# Patient Record
Sex: MALE | Race: WHITE | URBAN - METROPOLITAN AREA
[De-identification: names, ages, dates, MRNs, and addresses within clinical notes are randomized per-mention and may not be internally consistent; named-entity substitution may affect disease eponyms.]

---

## 2018-05-30 ENCOUNTER — HISTORICAL (OUTPATIENT)
Dept: ADMINISTRATIVE | Facility: HOSPITAL | Age: 68
End: 2018-05-30

## 2018-06-29 ENCOUNTER — HISTORICAL (OUTPATIENT)
Dept: ADMINISTRATIVE | Facility: HOSPITAL | Age: 68
End: 2018-06-29

## 2022-04-30 NOTE — OP NOTE
Patient:   Hung Hartley             MRN: [id number]            FIN: [id number]               Age:   67 years     Sex:  Male     :  1950   Associated Diagnoses:   None   Author:   Tee Hanson MD      Preoperative Diagnosis: Cataract Left eye    Postoperative Diagnosis: Cataract Left eye    Procedure: Phacoemulsification with intaocular lens implantations Left eye    Surgeon: Tee Hanson MD    Assistant:  Liliana Mcfarlane, [facility]    Anestheisa: Topical    Complications: None    The patient was brought into the operating suite, where the patient was correctly identified as was the operative eye via timeout.  The patient was prepped and draped in a sterile ophthalmic fashion.  A lid speculum was placed in the operative eye and the microscope was brought into place.  A 1.0mm paracentesis was then made at (6) o'clock.  The anterior chamber was filled with Endocoat.  A (temporal) clear corneal incision was made with a 2.4 mm keratome.  A 6 mm corneal marking ring was used to venancio the cornea centered over the visual axis.  A 5.00 mm continuous curvilinear capsulorhexis was fashioned using a cystotome and microcapsular forceps.  Hydrodissection and hydrodelineation was performed with upreserved 1% Xylocaine.  The nucleus was then phacoemulsified with the Abbott phacoemulsification hand-piece with a total of (0) EFX.  The cortex was then removed with the Yusuf I/A hand-piece. An SPARKLE lens model (ZCB00) with a power of (16.5) was placed in the capsular bag.  The Helon was then removed from the eye with the Yusuf I/A hand piece.  The anterior chamber was inflated and the wounds were hydrated with BSS.  The wounds were checked with Weck-Lucia sponges and found to be watertight.  The lid speculum was removed and topical antibiotics were placed on the operative eye.  The patient was brought to PACU in good condition.      Surgery Date 18 [facility]

## 2022-04-30 NOTE — OP NOTE
Patient:   Hung Hartley             MRN: [id number]            FIN: [id number]               Age:   67 years     Sex:  Male     :  1950   Associated Diagnoses:   None   Author:   Tee Hanson MD      Preoperative Diagnosis: Cataract Right eye    Postoperative Diagnosis: Cataract Right eye    Procedure: Phacoemulsification with intaocular lens implantations Right eye    Surgeon: Tee Hanson MD    Assistant: Liliana Mcfarlane, [facility]    Anestheisa: Topical    Complications: None    The patient was brought into the operating suite, where the patient was correctly identified as was the operative eye via timeout.  The patient was prepped and draped in a sterile ophthalmic fashion.  A lid speculum was placed in the operative eye and the microscope was brought into place.  A 1.0mm paracentesis was then made at (12) o'clock.  The anterior chamber was filled with Endocoat.  A (temporal) clear corneal incision was made with a 2.4 mm keratome.  A 6 mm corneal marking ring was used to venancio the cornea centered over the visual axis.  A 5.00 mm continuous curvilinear capsulorhexis was fashioned using a cystotome and microcapsular forceps.  Hydrodissection and hydrodelineation was performed with upreserved 1% Xylocaine.  The nucleus was then phacoemulsified with the Abbott phacoemulsification hand-piece with a total of (1) EFX.  The cortex was then removed with the Yusuf I/A hand-piece. An SPARKLE lens model (ZCB00) with a power of (15.5) was placed in the capsular bag.  The Helon was then removed from the eye with the Yusuf I/A hand piece. The anterior chamber was inflated and the wounds were hydrated with BSS.  The wounds were checked with Weck-Lucia sponges and found to be watertight.  The lid speculum was removed and topical antibiotics were placed on the operative eye.  The patient was brought to PACU in good condition.        Surgery Date 18 [facility]